# Patient Record
Sex: FEMALE | Race: WHITE | URBAN - METROPOLITAN AREA
[De-identification: names, ages, dates, MRNs, and addresses within clinical notes are randomized per-mention and may not be internally consistent; named-entity substitution may affect disease eponyms.]

---

## 2019-08-20 LAB
ABS NEUT (OLG): 6.21 X10(3)/MCL (ref 2.1–9.2)
ALBUMIN SERPL-MCNC: 4.2 GM/DL (ref 3.4–5)
ALBUMIN/GLOB SERPL: 1.3 {RATIO}
ALP SERPL-CCNC: 49 UNIT/L (ref 38–126)
ALT SERPL-CCNC: 26 UNIT/L (ref 12–78)
AMYLASE SERPL-CCNC: 38 UNIT/L (ref 25–115)
AST SERPL-CCNC: 16 UNIT/L (ref 15–37)
BASOPHILS # BLD AUTO: 0 X10(3)/MCL (ref 0–0.2)
BASOPHILS NFR BLD AUTO: 0 %
BILIRUB SERPL-MCNC: 0.6 MG/DL (ref 0.2–1)
BILIRUBIN DIRECT+TOT PNL SERPL-MCNC: 0.2 MG/DL (ref 0–0.2)
BILIRUBIN DIRECT+TOT PNL SERPL-MCNC: 0.4 MG/DL (ref 0–0.8)
BUN SERPL-MCNC: 11 MG/DL (ref 7–18)
CALCIUM SERPL-MCNC: 9.7 MG/DL (ref 8.5–10.1)
CHLORIDE SERPL-SCNC: 99 MMOL/L (ref 98–107)
CO2 SERPL-SCNC: 30 MMOL/L (ref 21–32)
CREAT SERPL-MCNC: 0.92 MG/DL (ref 0.55–1.02)
EOSINOPHIL # BLD AUTO: 0 X10(3)/MCL (ref 0–0.9)
EOSINOPHIL NFR BLD AUTO: 0 %
ERYTHROCYTE [DISTWIDTH] IN BLOOD BY AUTOMATED COUNT: 13.6 % (ref 11.5–17)
GLOBULIN SER-MCNC: 3.2 GM/DL (ref 2.4–3.5)
GLUCOSE SERPL-MCNC: 94 MG/DL (ref 74–106)
HCT VFR BLD AUTO: 41 % (ref 37–47)
HGB BLD-MCNC: 13.6 GM/DL (ref 12–16)
LIPASE SERPL-CCNC: 112 UNIT/L (ref 73–393)
LYMPHOCYTES # BLD AUTO: 2.4 X10(3)/MCL (ref 0.6–4.6)
LYMPHOCYTES NFR BLD AUTO: 25 %
MCH RBC QN AUTO: 29.1 PG (ref 27–31)
MCHC RBC AUTO-ENTMCNC: 33.2 GM/DL (ref 33–36)
MCV RBC AUTO: 87.8 FL (ref 80–94)
MONOCYTES # BLD AUTO: 0.8 X10(3)/MCL (ref 0.1–1.3)
MONOCYTES NFR BLD AUTO: 8 %
NEUTROPHILS # BLD AUTO: 6.21 X10(3)/MCL (ref 2.1–9.2)
NEUTROPHILS NFR BLD AUTO: 65 %
PLATELET # BLD AUTO: 303 X10(3)/MCL (ref 130–400)
PMV BLD AUTO: 11 FL (ref 9.4–12.4)
POTASSIUM SERPL-SCNC: 3.6 MMOL/L (ref 3.5–5.1)
PROT SERPL-MCNC: 7.4 GM/DL (ref 6.4–8.2)
RBC # BLD AUTO: 4.67 X10(6)/MCL (ref 4.2–5.4)
SODIUM SERPL-SCNC: 138 MMOL/L (ref 136–145)
WBC # SPEC AUTO: 9.5 X10(3)/MCL (ref 4.5–11.5)

## 2019-08-21 ENCOUNTER — HISTORICAL (OUTPATIENT)
Dept: SURGERY | Facility: HOSPITAL | Age: 64
End: 2019-08-21

## 2022-04-29 NOTE — OP NOTE
Patient:   Lb López             MRN: [id number]            FIN: [id number]               Age:   64 years     Sex:  Female     :  1955   Associated Diagnoses:   Cholelithiasis; Calculous cholecystitis   Author:   Tomy Call MD      Operative Note   Operative Information   Date/ Time:  2019 22:22:00.     Procedures Performed.     Indications.     Preoperative Diagnosis: Cholelithiasis (UNK04-FQ K80.20), Calculous cholecystitis (WBS69-OK K80.10).     Postoperative Diagnosis: Cholelithiasis (XSD63-SS K80.20), Calculous cholecystitis (LGY79-AT K80.10).     Surgeon: Tomy Call MD.     Assistant: Dena Galvez     Anesthesia: Gen..     Speciman Removed: gallbladder.     Description of Procedure/Findings/    Complications:     The patient was taken to the operating room. Patient was placed under general anesthesia. Abdomen was prepped and draped in the usual sterile fashion. An appropriate timeout was performed. Optical tipped trocar was used to access the peritoneal cavity. Pneumoperitoneum was instituted. Abdominal exploration was negative. Gallbladder was noted and held in a cephalad direction. The infundibulum was noted and held in a lateral direction. The peritoneum overlying the infundibulum was dissected revealing the cystic duct gallbladder junction and the cystic artery. The critical view was obtained. The cystic duct and cystic artery were clipped and transected. The gallbladder was removed from the undersurface of the liver with sharp and electric dissection. Hemostasis was assured. The clips were in excellent position and there was no bleeding or leakage of bile. Gallbladder was completely removed from the liver hemostasis was assured. The gallbladder was removed from the abdomen. Once again hemostasis was assured the operative site was irrigated until clear. Trochars were removed and pneumoperitoneum released the incisions were closed with Vicryl..     Esimated  blood loss: loss less than  15  cc.     Complications: None.

## 2025-06-04 ENCOUNTER — OFFICE VISIT (OUTPATIENT)
Dept: ORTHOPEDICS | Facility: CLINIC | Age: 70
End: 2025-06-04
Payer: MEDICARE

## 2025-06-04 ENCOUNTER — HOSPITAL ENCOUNTER (OUTPATIENT)
Dept: RADIOLOGY | Facility: CLINIC | Age: 70
Discharge: HOME | End: 2025-06-04
Attending: REHABILITATION UNIT
Payer: MEDICARE

## 2025-06-04 VITALS
HEART RATE: 63 BPM | SYSTOLIC BLOOD PRESSURE: 123 MMHG | BODY MASS INDEX: 22.44 KG/M2 | WEIGHT: 139.63 LBS | HEIGHT: 66 IN | DIASTOLIC BLOOD PRESSURE: 79 MMHG

## 2025-06-04 DIAGNOSIS — M25.512 LEFT SHOULDER PAIN, UNSPECIFIED CHRONICITY: Primary | ICD-10-CM

## 2025-06-04 DIAGNOSIS — M25.512 LEFT SHOULDER PAIN, UNSPECIFIED CHRONICITY: ICD-10-CM

## 2025-06-04 PROCEDURE — 1159F MED LIST DOCD IN RCRD: CPT | Mod: CPTII,,, | Performed by: REHABILITATION UNIT

## 2025-06-04 PROCEDURE — 20610 DRAIN/INJ JOINT/BURSA W/O US: CPT | Mod: LT,,,

## 2025-06-04 PROCEDURE — 99203 OFFICE O/P NEW LOW 30 MIN: CPT | Mod: 25,,, | Performed by: REHABILITATION UNIT

## 2025-06-04 PROCEDURE — 3074F SYST BP LT 130 MM HG: CPT | Mod: CPTII,,, | Performed by: REHABILITATION UNIT

## 2025-06-04 PROCEDURE — 3078F DIAST BP <80 MM HG: CPT | Mod: CPTII,,, | Performed by: REHABILITATION UNIT

## 2025-06-04 PROCEDURE — 73030 X-RAY EXAM OF SHOULDER: CPT | Mod: LT,,, | Performed by: REHABILITATION UNIT

## 2025-06-04 PROCEDURE — 3008F BODY MASS INDEX DOCD: CPT | Mod: CPTII,,, | Performed by: REHABILITATION UNIT

## 2025-06-04 RX ORDER — OMEPRAZOLE 20 MG/1
20 CAPSULE, DELAYED RELEASE ORAL EVERY MORNING
COMMUNITY
Start: 2025-04-02

## 2025-06-04 RX ORDER — GABAPENTIN 600 MG/1
600 TABLET ORAL
COMMUNITY
Start: 2025-04-02

## 2025-06-04 RX ORDER — FENOFIBRATE 145 MG/1
145 TABLET, FILM COATED ORAL EVERY MORNING
COMMUNITY
Start: 2025-03-23

## 2025-06-04 RX ORDER — MELOXICAM 15 MG/1
15 TABLET ORAL
COMMUNITY

## 2025-06-04 RX ORDER — HYDROCHLOROTHIAZIDE 12.5 MG/1
12.5 TABLET ORAL EVERY MORNING
COMMUNITY
Start: 2025-03-23

## 2025-06-04 RX ORDER — GABAPENTIN 300 MG/1
300 CAPSULE ORAL
COMMUNITY
Start: 2025-04-02

## 2025-06-04 RX ORDER — DULOXETIN HYDROCHLORIDE 60 MG/1
60 CAPSULE, DELAYED RELEASE ORAL NIGHTLY
COMMUNITY
Start: 2025-03-23

## 2025-06-04 RX ORDER — TRAZODONE HYDROCHLORIDE 100 MG/1
200 TABLET ORAL
COMMUNITY
Start: 2025-04-02

## 2025-06-04 RX ORDER — NABUMETONE 500 MG/1
TABLET, FILM COATED ORAL
COMMUNITY
Start: 2024-09-23

## 2025-06-04 RX ORDER — HYDROCODONE BITARTRATE AND ACETAMINOPHEN 10; 325 MG/1; MG/1
1 TABLET ORAL EVERY 6 HOURS PRN
COMMUNITY
Start: 2025-04-16

## 2025-06-04 RX ORDER — TRAMADOL HYDROCHLORIDE AND ACETAMINOPHEN 37.5; 325 MG/1; MG/1
1 TABLET ORAL EVERY 6 HOURS PRN
COMMUNITY

## 2025-06-04 RX ORDER — TIZANIDINE 4 MG/1
8 TABLET ORAL
COMMUNITY

## 2025-06-04 RX ORDER — METOPROLOL SUCCINATE 50 MG/1
50 TABLET, EXTENDED RELEASE ORAL EVERY MORNING
COMMUNITY
Start: 2025-03-23

## 2025-06-04 RX ORDER — DENOSUMAB 60 MG/ML
INJECTION SUBCUTANEOUS
COMMUNITY
Start: 2024-12-16

## 2025-06-04 RX ADMIN — LIDOCAINE HYDROCHLORIDE 3 ML: 10 INJECTION, SOLUTION INFILTRATION; PERINEURAL at 02:06

## 2025-06-04 RX ADMIN — BETAMETHASONE SODIUM PHOSPHATE AND BETAMETHASONE ACETATE 6 MG: 3; 3 INJECTION, SUSPENSION INTRA-ARTICULAR; INTRALESIONAL; INTRAMUSCULAR; SOFT TISSUE at 02:06

## 2025-06-04 NOTE — PROGRESS NOTES
Subjective:      Patient ID: Lb López is a 70 y.o. female.    Chief Complaint: Pain of the Left Shoulder (Left Shoulder Pain Patient states she fell coming out of the shower about 2wks ago and it throbs and she can't sleep. She just had a bone density test done and it shows osteoporosis.  She has been icing and putting muscle rub. )    HPI:   Lb López is a 70 y.o. female who presents today for initial evaluation of her left shoulder    History of Present Illness    CHIEF COMPLAINT:  - Left shoulder pain following a fall two weeks ago.    HPI:  Lb, a retired neuromuscular massage therapist, presents with left shoulder pain following a fall while exiting the shower two weeks ago. The pain is sharp, particularly in the front of the shoulder, and is severe. It worsens with movement, especially when lifting her arm, and causes difficulty sleeping. She has a history of osteoporosis and takes Prolia.    She reports occasional left shoulder pain starting about a year and a half ago after learning to play pool, which would flare up with activities like grocery shopping. Recently, she strained her shoulder while attempting to move a portable safe in her closet. She has been attempting self-massage but notes increased pain.    She has a history of degenerative disc disease, for which she receives injections from Dr. Fernandez every 3-4 months for severe L spine pain. She takes hydrocodone 10mg, muscle relaxers, anti-inflammatories, and Gabapentin 900mg 3 times daily, which she reports does not adequately manage her pain.    She expresses concern about ignoring the pain, stating that ignoring symptoms can lead to worsening conditions.    She denies any swelling in the affected shoulder, fractures, or breaks in the current injury.    PREVIOUS TREATMENTS:  - Injections from Dr. Fernandez: Every 3-4 months, provides some benefit for severe L spine pain    IMAGING:  - XR Left Shoulder: No fractures. Some arthritic  "changes in the glenohumeral joint. Hardware in the patient's neck from previous neck surgery.    MEDICATIONS:  - Prolia: For osteoporosis  - Hydrocodone 10 mg  - Muscle relaxers  - Anti-inflammatory medication  - Gabapentin 900 mg 3 times daily: Did not provide benefit for current shoulder pain    SURGICAL HISTORY:  - Right shoulder surgery  - Neck surgery    WORK STATUS:  - Retired  - Previously worked as a neuromuscular massage therapist    SOCIAL HISTORY:  - Has a boyfriend      ROS:  Constitutional: +sleep disturbances, +sleep difficulty, +difficulty falling asleep, +difficulty staying asleep  Musculoskeletal: +back pain, +limb pain, +muscle weakness, +pain with movement          Past Medical History:   Diagnosis Date    Fibromyalgia     Hypertension     Osteoporosis      Past Surgical History:   Procedure Laterality Date    right arm/shoulder Right     SPINAL FUSION      C5&6    WRIST SURGERY Right      Social History[1]    Current Medications[2]  Review of patient's allergies indicates:   Allergen Reactions    Sulfa (sulfonamide antibiotics) Hives       /79 (BP Location: Right arm, Patient Position: Sitting)   Pulse 63   Ht 5' 6" (1.676 m)   Wt 63.3 kg (139 lb 9.6 oz)   BMI 22.53 kg/m²     Comprehensive review of systems completed and negative except as per HPI.        Objective:   Head: Normocephalic, without obvious abnormality, atraumatic  Eyes: conjunctivae/corneas clear. EOM's intact  Ears: normal external appearance  Nose: Nares normal. Septum midline. Mucosa normal. No drainage  Throat: normal findings: lips normal without lesions  Lungs: unlabored breathing on room air  Chest wall: symmetric chest rise  Heart: regular rate and rhythm  Pulses: 2+ and symmetric  Skin: Skin color, texture, turgor normal. No rashes or lesions  Neurologic: Grossly normal    left SHOULDER    Appearance:   normal    Cervical Spine:   Reduced motion    Tenderness:   About shoulder    AROM:   , Abduction 140, ER " 60, IR pocket    PROM:  same    Pain:  AROM: Positive  PROM:  Positive  End ROM: Positive  Supraspinatus strength testing: Positive  External rotation strength testing: Positive  Su-scapular: Positive  Virtually all provacative maneuvers Positive    Strength:  Supraspinatus:  Reduced  External rotation: intact    Provocative Maneuvers:     Rotator Cuff/Biceps/AC Joint  Neer's Sign: Positive  Hawkin's Test: Positive  Painful arc: Positive  Belly Press: Negative  Bear Hugger Test: Negative  Hornblower's Sign: Negative  Speed's Test: Positive  Yergeson's Test: Positive  Cross Arm Abduction: Positive    Pulses: Palpable radial pulse    Neurological deficits: None    The patient has a warm and well-perfused upper extremity with capillary refill less than 2 seconds. Sensation is intact to light touch in terminal nerve distributions. 5/5 ain/pin/uln. The patient has no palpable epitrochlear lymphadenopathy.      Assessment:         1. Left shoulder pain, unspecified chronicity          Plan:       Orders Placed This Encounter    Large Joint Aspiration/Injection: L subacromial bursa    X-Ray Shoulder 2 or More Views Left        Imaging and exam findings discussed.  Acute left shoulder discomfort.  Overall she has decent motion and strength.  We will start with a steroid injection.  This was provided and she tolerated well.  Post-injection care was discussed.  Plan for follow up in 6-8 weeks if she has continued issues.  If she has significant limitations may obtained MRI prior to the visit.  She is instructed on home exercise program.  She will avoid aggravating activities and symptomatically managed.  Continue her medical regimen as above.    All questions were answered. Patient happy and in agreement with the plan.     This note was generated with the assistance of ambient listening technology. Verbal consent was obtained by the patient and accompanying visitor(s) for the recording of patient appointment to facilitate  this note. I attest to having reviewed and edited the generated note for accuracy, though some syntax or spelling errors may persist. Please contact the author of this note for any clarification.              [1]   Social History  Socioeconomic History    Marital status:    Tobacco Use    Smoking status: Never    Smokeless tobacco: Never   Substance and Sexual Activity    Alcohol use: Not Currently    Drug use: Never     Social Drivers of Health     Financial Resource Strain: Low Risk  (4/2/2025)    [facility]    Overall Financial Resource Strain (CARDIA)     Difficulty of Paying Living Expenses: Not hard at all   Food Insecurity: Unknown (4/2/2025)    [facility]    Hunger Vital Sign     Worried About Running Out of Food in the Last Year: Never true   Transportation Needs: No Transportation Needs (4/2/2025)    [facility]    PRAPARE - Transportation     Lack of Transportation (Medical): No     Lack of Transportation (Non-Medical): No   Physical Activity: Insufficiently Active (4/2/2025)    [facility]    Exercise Vital Sign     Days of Exercise per Week: 3 days     Minutes of Exercise per Session: 30 min   Stress: No Stress Concern Present (4/2/2025)    [facility]    [employer] - Occupational Stress Questionnaire     Feeling of Stress : Not at all   Housing Stability: Low Risk  (4/2/2025)    [facility]    Housing Stability Vital Sign     Unable to Pay for  Housing in the Last Year: No     Number of Times Moved in the Last Year: 0     Homeless in the Last Year: No   [2]   Current Outpatient Medications:     DULoxetine (CYMBALTA) 60 MG capsule, Take 60 mg by mouth every evening., Disp: , Rfl:     fenofibrate (TRICOR) 145 MG tablet, Take 145 mg by mouth every morning., Disp: , Rfl:     gabapentin (NEURONTIN) 300 MG capsule, Take 300 mg by mouth., Disp: , Rfl:     gabapentin (NEURONTIN) 600 MG tablet, Take 600 mg by mouth., Disp: , Rfl:     hydroCHLOROthiazide 12.5 MG Tab, Take 12.5 mg by mouth every morning., Disp: , Rfl:     HYDROcodone-acetaminophen (NORCO)  mg per tablet, Take 1 tablet by mouth every 6 (six) hours as needed., Disp: , Rfl:     meloxicam (MOBIC) 15 MG tablet, Take 15 mg by mouth., Disp: , Rfl:     metoprolol succinate (TOPROL-XL) 50 MG 24 hr tablet, Take 50 mg by mouth every morning., Disp: , Rfl:     nabumetone (RELAFEN) 500 MG tablet, TAKE 1 TABLET BY MOUTH THREE TIMES DAILY WITH FOOD FOR PAIN OR INFLAMMATION, Disp: , Rfl:     omeprazole (PRILOSEC) 20 MG capsule, Take 20 mg by mouth every morning., Disp: , Rfl:     PROLIA 60 mg/mL Syrg, ADMINISTER 1 ML UNDER THE SKIN ONCE EVERY 6 MONTHS, Disp: , Rfl:     tiZANidine (ZANAFLEX) 4 MG tablet, Take 8 mg by mouth., Disp: , Rfl:     tramadol-acetaminophen 37.5-325 mg (ULTRACET) 37.5-325 mg Tab, Take 1 tablet by mouth every 6 (six) hours as needed., Disp: , Rfl:     traZODone (DESYREL) 100 MG tablet, Take 200 mg by mouth., Disp: , Rfl:

## 2025-06-04 NOTE — PROCEDURES
Large Joint Aspiration/Injection: L subacromial bursa    Date/Time: 6/4/2025 2:15 PM    Performed by: Melody Hodges PA-C  Authorized by: Albert Patino MD    Consent Done?:  Yes (Verbal)  Indications:  Arthritis and pain  Local anesthesia used?: No      Details:  Needle Size:  22 G  Ultrasonic Guidance for needle placement?: No    Approach:  Posterior  Location:  Shoulder  Site:  L subacromial bursa  Medications:  3 mL LIDOcaine HCL 10 mg/ml (1%) 10 mg/mL (1 %); 6 mg betamethasone acetate-betamethasone sodium phosphate 6 mg/mL

## 2025-06-23 RX ORDER — LIDOCAINE HYDROCHLORIDE 10 MG/ML
3 INJECTION, SOLUTION INFILTRATION; PERINEURAL
Status: DISCONTINUED | OUTPATIENT
Start: 2025-06-04 | End: 2025-06-23 | Stop reason: HOSPADM

## 2025-06-23 RX ORDER — BETAMETHASONE SODIUM PHOSPHATE AND BETAMETHASONE ACETATE 3; 3 MG/ML; MG/ML
6 INJECTION, SUSPENSION INTRA-ARTICULAR; INTRALESIONAL; INTRAMUSCULAR; SOFT TISSUE
Status: DISCONTINUED | OUTPATIENT
Start: 2025-06-04 | End: 2025-06-23 | Stop reason: HOSPADM